# Patient Record
(demographics unavailable — no encounter records)

---

## 2024-12-13 NOTE — PLAN
[FreeTextEntry1] : cultures performed dw pt rba ocps, try drospirinone pro in ocps.  risks incl incr risk thromboembolism dw pt.  night sweats , hot flashes, off week, check fsh, est, tsh

## 2024-12-17 NOTE — DISCUSSION/SUMMARY
[FreeTextEntry1] : Recommend she restart therapy.  Sent a task to Valeria to help with therapy.  Likely may need meds so I told Joi to look for an adult provider who may be able to write for meds if needed- internist, psych, NP. Discussed relaxation techniques and trying Melatonin for sleep.

## 2024-12-17 NOTE — REVIEW OF SYSTEMS
[Fever] : no fever [Night Sweats] : night sweats [Headache] : headache [Chest Pain] : chest pain [Vomiting] : no vomiting [Diarrhea] : no diarrhea [Abdominal Pain] : abdominal pain [Negative] : Skin

## 2024-12-17 NOTE — HISTORY OF PRESENT ILLNESS
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  She has been having anxiety x years, but it is worsening. She has been having hot flashes and trouble sleeping.  She always feels shaky and nervous.  Sometimes she has chest pain  or abdominal pain. She did have BW a few months ago which was normal.  She is on OCP and her GYN is starting her on a different pill.  She is not on any other medication.  She drinks one cup of coffee per day usually. Her health has been otherwise fine.  She did see a therapist in the past but not recently.

## 2025-01-09 NOTE — PLAN
[FreeTextEntry1] : 20-year-old female for new patient office visit.  Anxiety.  Zoloft and adverse reactions discussed.   All questions answered.  Patient voiced understanding and in agreement to plan.  Return to clinic as recommended.

## 2025-01-09 NOTE — HISTORY OF PRESENT ILLNESS
[FreeTextEntry8] : 20-year-old female for new patient office visit.  Notes ongoing concerns with anxiety.  Saw counselor previously.  Is interested in medication.  Would like to start Zoloft.  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  Lives at home with her mom.

## 2025-01-09 NOTE — REVIEW OF SYSTEMS
[Fever] : no fever [Fatigue] : no fatigue [Discharge] : no discharge [Earache] : no earache [Sore Throat] : no sore throat [Chest Pain] : no chest pain [Palpitations] : no palpitations [Cough] : no cough [Abdominal Pain] : no abdominal pain [Constipation] : no constipation [Diarrhea] : diarrhea [Dysuria] : no dysuria [Headache] : no headache

## 2025-02-20 NOTE — PHYSICAL EXAM
[No Acute Distress] : no acute distress [Well Nourished] : well nourished [Well Developed] : well developed [Well-Appearing] : well-appearing [Normal Voice/Communication] : normal voice/communication [Normal Sclera/Conjunctiva] : normal sclera/conjunctiva [No Respiratory Distress] : no respiratory distress  [Speech Grossly Normal] : speech grossly normal [Normal Affect] : the affect was normal [Normal Mood] : the mood was normal

## 2025-02-20 NOTE — PLAN
[FreeTextEntry1] : 20-year-old female for follow-up anxiety.  Stable.  Continue Zoloft 50 mg daily.  All questions answered.  Patient voiced understanding and agreement to plan.  Return to clinic as recommended

## 2025-02-20 NOTE — HISTORY OF PRESENT ILLNESS
[Home] : at home, [unfilled] , at the time of the visit. [Medical Office: ([facility])___] : at the medical office located in  [Telehealth (audio & video)] : This visit was provided via telehealth using real-time 2-way audio visual technology. [Verbal consent obtained from patient] : the patient, [unfilled] [FreeTextEntry1] : f/u [de-identified] : 19 yo F for f/u anxiety.  Zoloft seems to be working well.

## 2025-07-01 NOTE — REVIEW OF SYSTEMS
[Sore Throat] : sore throat [Fever] : no fever [Fatigue] : no fatigue [Discharge] : no discharge [Earache] : no earache [Chest Pain] : no chest pain [Palpitations] : no palpitations [Cough] : no cough [Abdominal Pain] : no abdominal pain [Constipation] : no constipation [Diarrhea] : diarrhea [Dysuria] : no dysuria [Headache] : no headache

## 2025-07-01 NOTE — PLAN
[FreeTextEntry1] : 19 yo F for sore throat. Rapid strep negative. Supportive therapy recommended for now. If no improvement. course Augmentin discussed.  Signs and symptoms warranting further eval advised.  All questions answered.  Patient voiced understanding and in agreement to plan.  Return to clinic as recommended

## 2025-07-01 NOTE — HISTORY OF PRESENT ILLNESS
[FreeTextEntry8] : 20-year-old female for sore throat on right-hand side along with ear pain.  No other symptoms.

## 2025-07-22 NOTE — REVIEW OF SYSTEMS
[Fever] : no fever [Fatigue] : no fatigue [Discharge] : no discharge [Itching] : no itching [Earache] : no earache [Sore Throat] : no sore throat [Chest Pain] : no chest pain [Palpitations] : no palpitations [Cough] : no cough [Abdominal Pain] : no abdominal pain [Constipation] : no constipation [Diarrhea] : diarrhea [Dysuria] : no dysuria [Headache] : no headache

## 2025-07-22 NOTE — PLAN
[FreeTextEntry1] : 20-year-old female for CPE.  Labs as ordered.    Anxiety.  Stable.  Continue Zoloft 50 mg daily.    All questions answered.  Patient voiced understanding and agreement to plan.  Return to clinic as recommended

## 2025-07-22 NOTE — HISTORY OF PRESENT ILLNESS
[FreeTextEntry1] : CPE [de-identified] : 21 yo F for CPE.   Hx of anxiety.  Doing well on Zoloft.  Requesting refill.